# Patient Record
Sex: FEMALE | Race: WHITE | URBAN - METROPOLITAN AREA
[De-identification: names, ages, dates, MRNs, and addresses within clinical notes are randomized per-mention and may not be internally consistent; named-entity substitution may affect disease eponyms.]

---

## 2021-12-10 ENCOUNTER — OFFICE VISIT (OUTPATIENT)
Dept: ORTHOPEDIC SURGERY | Age: 51
End: 2021-12-10
Payer: OTHER GOVERNMENT

## 2021-12-10 VITALS
BODY MASS INDEX: 19.29 KG/M2 | SYSTOLIC BLOOD PRESSURE: 110 MMHG | WEIGHT: 120 LBS | DIASTOLIC BLOOD PRESSURE: 70 MMHG | HEIGHT: 66 IN

## 2021-12-10 DIAGNOSIS — M25.569 KNEE PAIN, UNSPECIFIED CHRONICITY, UNSPECIFIED LATERALITY: Primary | ICD-10-CM

## 2021-12-10 DIAGNOSIS — S83.242S TEAR OF MEDIAL MENISCUS OF LEFT KNEE, UNSPECIFIED TEAR TYPE, UNSPECIFIED WHETHER OLD OR CURRENT TEAR, SEQUELA: ICD-10-CM

## 2021-12-10 PROCEDURE — 99204 OFFICE O/P NEW MOD 45 MIN: CPT | Performed by: ORTHOPAEDIC SURGERY

## 2021-12-10 RX ORDER — LAMOTRIGINE 150 MG/1
TABLET ORAL
COMMUNITY
Start: 2021-11-26

## 2021-12-10 RX ORDER — BUPROPION HYDROCHLORIDE 300 MG/1
TABLET ORAL
COMMUNITY
Start: 2021-11-04

## 2021-12-10 RX ORDER — VENLAFAXINE HYDROCHLORIDE 75 MG/1
75 CAPSULE, EXTENDED RELEASE ORAL DAILY
COMMUNITY
Start: 2021-11-04

## 2021-12-10 RX ORDER — DICLOFENAC SODIUM 10 MG/G
GEL TOPICAL
COMMUNITY
Start: 2021-11-04

## 2021-12-10 NOTE — PATIENT INSTRUCTIONS
MRI SCHEDULING INSTRUCTIONS    In order to schedule your MRI with Artem Frias, please call [phone]. If it is more convenient, you may stop by the MRI department on the way out of the office. The MRI department is located on the 1st floor, [address]. They will happy to assist you. Once you have your MRI scheduled, please contact appointment scheduling at [phone] option #2 to make your follow up appointment with Dr. Mirian Taylor to come in for your results! The appointment should be made for at least 3 business days after the MRI appointment. Thank you,  Dr. Carolina Obregon III, MD         Magnetic Resonance Imaging (MRI): About This Test  What is it? Magnetic resonance imaging (MRI) is a test that uses a magnetic field and pulses of radio wave energy to make pictures of organs and structures inside the body. When you have an MRI, you lie on a table and your body is moved into the MRI machine, where an image is taken of the area of the body being studied. Why is this test done? There are many reasons to have an MRI test. This test can find problems such as tumors, bleeding, injury, conditions that some children are born with, and infection. An MRI also may provide more information about a problem seen on an X-ray, ultrasound scan, CT scan, or nuclear medicine exam.  How can you prepare for the test?  For some MRI pictures of the belly, you may be asked to not eat or drink for several hours before the test.  Tell your doctor if you get nervous in tight spaces. You may get a medicine to help you relax. If you think you'll get this medicine, be sure to arrange a ride home. It may be unsafe for you to drive or get home on your own. How is the test done? · You may have contrast materials (dye) put into your arm through a tube called an IV. · You will lie on a table that is part of the MRI scanner. · The table will slide into the space that contains the magnet.   · Inside the scanner you will hear a fan and feel air moving. You may hear tapping, thumping, or snapping noises. You may be given earplugs or headphones to reduce the noise. · You will be asked to hold still during the scan. You may be asked to hold your breath for short periods. · You may be alone in the scanning room. But a technologist will watch you through a window and talk with you during the test.  What are the risks of the test?  · Contrast material that contains gadolinium may be used in this test. But for most people, the benefit of its use in this test outweighs the risk. Be sure to tell your doctor if you have kidney problems or are pregnant. · If you breastfeed and are concerned about whether the dye used in this test is safe, talk to your doctor. Most experts believe that very little dye passes into breast milk and even less is passed on to the baby. But if you are concerned, you can stop breastfeeding for up to 24 hours after the test. During this time, you can give your baby breast milk that you stored before the test. Don't use the breast milk you pump in the 24 hours after the test. Throw it out. How long does the test take? The test usually takes 30 to 60 minutes. But it can take as long as 2 hours. What happens after the test?  You will probably be able to go home right away, depending on the reason for the test.  Follow-up care is a key part of your treatment and safety. Be sure to make and go to all appointments, and call your doctor if you are having problems. It's also a good idea to keep a list of the medicines you take. Ask your doctor when you can expect to have your test results. Where can you learn more? Go to http://www.gray.com/  Enter V016 in the search box to learn more about \"Magnetic Resonance Imaging (MRI): About This Test.\"  Current as of: June 17, 2021               Content Version: 13.0  © 0892-7339 [employer].    Care instructions adapted under license by [address] (which disclaims liability or warranty for this information). If you have questions about a medical condition or this instruction, always ask your healthcare professional. [address] any warranty or liability for your use of this information.

## 2021-12-10 NOTE — PROGRESS NOTES
ASSESSMENT/PLAN:  Below is the assessment and plan developed based on review of pertinent history, physical exam, labs, studies, and medications. 1. Knee pain, unspecified chronicity, unspecified laterality  -     XR KNEE LT MIN 4 V; Future  2. Tear of medial meniscus of left knee, unspecified tear type, unspecified whether old or current tear, sequela  -     MRI KNEE LT WO CONT; Future      Return for Follow-up after diagnostic test.      In discussion with the patient, we considered the numerus possible diagnoses that could be contributing to their present symptoms. We also deliberated on the extensive management options that must be considered to treat their current condition. We reviewed their accessible prior medical records, diagnostic tests, and current health and employment information. We considered how these symptoms were affecting the patient´s activities of daily living as well as employment and fitness activities. The patient had various questions regarding the possible risks, benefits, complications, morbidity and mortality regarding their diagnosis and treatment options. The patients´ comorbidities were considered, and I advocated that they consider maximizing lifestyle modification through nutrition and exercise to aid in addressing their symptoms. Shared decision making yielded an understanding to move forward with conservation treatment preferences. The patient expressed understanding that if conservative management fails to alleviate the present symptoms they will return to office for re-evaluation and consideration of additional diagnostic tests and potential surgical options.      In the interim, we have recommned ice, elevation and anti-inflammatory medications along with a physician directed home exercise program. We discussed the risks and common side effects of anti-inflamatory medications and instructed the patient to discontinue the medication and contact us if they experienced any side effects. The patient was encouraged to discuss the possible side effects with their family physician or pharmacist prior to initiating any new medications. Given that the patient's symptoms are increasing in frequency and duration, we have decided to evaluate the etiology of the pain and loss of function with an MRI. We discussed the risks of an MRI which include, but are not limited to the enclosed space, noisy environment, magnetic affect on implnated metal. We also talked about the fact that MRI is also contraindicated in the presence of internal metallic objects such as bullets or shrapnel, as well as surgical clips, pins, plates, screws, metal sutures, or wire mesh. We talked about the fact that MRI does not use radiation, but it may be contrindicated if the patient has implanted pacemakers, intracranial aneurysm clips, cochlear implants, certain prosthetic devices, implanted drug infusion pumps, neurostimulators, bone-growth stimulators, certain intrauterine contraceptive devices; or any other type of iron-based metal implants. We discussed the fact that if you are pregnant or suspect that you may be pregnant, you should notify your physician and consult with your primary care or obstetrician before having an MRI. Although rare, we talked about the fact that if contrast dye is used, there is a risk for allergic reaction to the dye. Patients who are allergic to or sensitive to medications, contrast dye, iodine, or shellfish should notify the radiologist or technologist prior to the administration of dye. MRI contrast may also have an effect on other conditions such as allergies, asthma, anemia, hypotension (low blood pressure), and sickle cell disease. The patient has expressed understadning of these risk and I will see the patient back after the MRI to discuss the findings as well as the treatment options.           SUBJECTIVE/OBJECTIVE:  Caroline Henry (: 1970) is a 54 y.o. female, patient,here for evaluation of the Knee Pain (left knee)  She reports she injured her knee 3 weeks ago. She reports she is continued to have discomfort despite anti-inflammatory medications. She reports it bothers her at night. She reports her presents gotten better. She denies any previous injuries to the knee. Physical Exam    Upon physical examination, the patient is well developed, well nourished, alert and oriented times three, with normal mood and affect and walks with an antalgic gait. Upon examination of the left knee, the patient is tender to palpation along the medial joint line, and has an effusion. The patient has discomfort with Almas´s maneuvers, and the knee is stable. They lack full flexion secondary to the effusion, but have full extension. They have 5/5 strength, and are neurovascularly intact distally. There is no erythema, warmth or skin lesions present. On examination of the contralateral extremity, the patient is nontender to palpation and has excellent range of motion, stability and strength. Imagin views of the left knee show no fracture or dislocation. Allergies   Allergen Reactions    Penicillins Hives       Current Outpatient Medications   Medication Sig    buPROPion XL (WELLBUTRIN XL) 300 mg XL tablet TAKE 1 TABLET BY MOUTH IN THE MORNING    diclofenac (VOLTAREN) 1 % gel     lamoTRIgine (LaMICtal) 150 mg tablet TAKE 1 TABLET BY MOUTH IN THE MORNING    venlafaxine-SR (EFFEXOR-XR) 75 mg capsule Take 75 mg by mouth daily. No current facility-administered medications for this visit. History reviewed. No pertinent past medical history. History reviewed. No pertinent surgical history. History reviewed. No pertinent family history.     Social History     Socioeconomic History    Marital status:      Spouse name: Not on file    Number of children: Not on file    Years of education: Not on file    Highest education level: Not on file Occupational History    Not on file   Tobacco Use    Smoking status: Former Smoker    Smokeless tobacco: Never Used   Vaping Use    Vaping Use: Never used   Substance and Sexual Activity    Alcohol use: Yes     Comment: rarely    Drug use: Yes     Types: Marijuana     Comment: rarely    Sexual activity: Not on file   Other Topics Concern    Not on file   Social History Narrative    Not on file     Social Determinants of Health     Financial Resource Strain:     Difficulty of Paying Living Expenses: Not on file   Food Insecurity:     Worried About [facility] in the Last Year: Not on file    Tony of Food in the Last Year: Not on file   Transportation Needs:     Lack of Transportation (Medical): Not on file    Lack of Transportation (Non-Medical): Not on file   Physical Activity:     Days of Exercise per Week: Not on file    Minutes of Exercise per Session: Not on file   Stress:     Feeling of Stress : Not on file   Social Connections:     Frequency of Communication with Friends and Family: Not on file    Frequency of Social Gatherings with Friends and Family: Not on file    Attends Qu[employer] Services: Not on file    Active Member of [address] [place] [address] or Organizations: Not on file    Attends Club or Organization Meetings: Not on file    Marital Status: Not on file   Intimate Partner Violence:     Fear of Current or Ex-Partner: Not on file    Emotionally Abused: Not on file    Physically Abused: Not on file    Sexually Abused: Not on file   Housing Stability:     Unable to Pay for Housing in the Last Year: Not on file    Number of [place] in the Last Year: Not on file    Unstable Housing in the Last Year: Not on file       Review of Systems    No flowsheet data found. Vitals:  /70   Ht 5' 6\" (1.676 m)   Wt 120 lb (54.4 kg)   BMI 19.37 kg/m²    Body mass index is 19.37 kg/m². An electronic signature was used to authenticate this note.   -- Leopoldo Aurora, MD

## 2021-12-21 ENCOUNTER — OFFICE VISIT (OUTPATIENT)
Dept: ORTHOPEDIC SURGERY | Age: 51
End: 2021-12-21
Payer: OTHER GOVERNMENT

## 2021-12-21 DIAGNOSIS — M79.641 RIGHT HAND PAIN: ICD-10-CM

## 2021-12-21 DIAGNOSIS — R22.31 MASS OF FINGER OF RIGHT HAND: Primary | ICD-10-CM

## 2021-12-21 PROCEDURE — 99203 OFFICE O/P NEW LOW 30 MIN: CPT | Performed by: ORTHOPAEDIC SURGERY

## 2021-12-21 NOTE — PROGRESS NOTES
HPI: Nam La (: 1970) is a 54 y.o. female, patient, here for evaluation of the following chief complaint(s):    Hand Pain (left)  The patient seen today to evaluate her right hand. She reported that approxi-20 years ago her right ring finger was caught and went in another direction. She has developed a soft tissue mass versus cyst to the radial border of the right ring finger at the proximal phalanx level. She thinks this time she could have started in the late . She had x-rays obtained through [facility] 2 weeks ago that showed no bone involvement. Her  is active duty. She denies any fall or other injury and is seen for further treatment of her right hand. Vitals: There were no vitals taken for this visit. There is no height or weight on file to calculate BMI. Allergies   Allergen Reactions    Penicillins Hives       Current Outpatient Medications   Medication Sig    buPROPion XL (WELLBUTRIN XL) 300 mg XL tablet TAKE 1 TABLET BY MOUTH IN THE MORNING    diclofenac (VOLTAREN) 1 % gel     lamoTRIgine (LaMICtal) 150 mg tablet TAKE 1 TABLET BY MOUTH IN THE MORNING    venlafaxine-SR (EFFEXOR-XR) 75 mg capsule Take 75 mg by mouth daily. No current facility-administered medications for this visit. History reviewed. No pertinent past medical history. History reviewed. No pertinent surgical history. History reviewed. No pertinent family history.      Social History     Tobacco Use    Smoking status: Former Smoker    Smokeless tobacco: Never Used   Vaping Use    Vaping Use: Never used   Substance Use Topics    Alcohol use: Yes     Comment: rarely    Drug use: Yes     Types: Marijuana     Comment: rarely        Review of Systems    ROS     Positive for: Musculoskeletal    Other comments for: HENT (left hand)    Last edited by Paulo Francisco RN on 2021  4:53 PM. (History)             Physical Exam    Patient is right ring finger is soft tissue swelling of presumed soft tissue mass versus cyst about 1 x 1.5 cm in size to the ulnar border of the proximal phalanx. This appears fairly deep clinically and does not inhibit motion but it is large enough that it seems to keep the small finger abducted. She otherwise is full extension and flexion with normal sensation refill. Examination of the right wrist reveals no swelling, edema or warmth, no tenderness to palpation, no pain, normal strength and tone, normal range of motion, no crepitus, normal sensation, no instability or laxity and no known fractures or deformities. Examination of the left wrist reveals no swelling, edema or warmth, no tenderness to palpation, no pain, normal strength and tone, normal range of motion, no crepitus, normal sensation, no instability or laxity and no known fractures or deformities. Examination of the left hand reveals no tenderness to palpation, no pain, normal  strength, normal tone, normal range of motion, no crepitus, no instability or laxity, no known fractures or deformities and normal sensation. Imaging: Outside x-rays reportedly from [facility] showed normal osseous and joint space findings of the right hand and no fracture or calcification. ASSESSMENT/PLAN:  Below is the assessment and plan developed based on review of pertinent history, physical exam, labs, studies, and medications. Patient examination was consistent with a right ring finger soft tissue mass although a firm cyst could not be excluded. There has been no reported history of a puncture injury. This may represent a fibroma or giant cell tumor tendon sheath but certainly cannot exclude a larger ganglion and/or epidermoid occlusion cyst.  Nonetheless I recommended surgical excision of the lesion. I reviewed risks that include but not limited to stiffness, pain, nerve or tendon damage and recurrence. Arrangements can be made for this to be performed on an outpatient basis at her convenience.     1. Mass of finger of right hand  2. Right hand pain      Return for Follow-up 7 to 10 days after surgery. .    An electronic signature was used to authenticate this note.   -- Steve Yap MD

## 2021-12-22 DIAGNOSIS — R22.31 MASS OF FINGER OF RIGHT HAND: Primary | ICD-10-CM

## 2022-01-03 DIAGNOSIS — R22.31 MASS OF FINGER OF RIGHT HAND: Primary | ICD-10-CM

## 2022-01-03 RX ORDER — HYDROCODONE BITARTRATE AND ACETAMINOPHEN 5; 325 MG/1; MG/1
1 TABLET ORAL
Qty: 15 TABLET | Refills: 0 | Status: SHIPPED | OUTPATIENT
Start: 2022-01-03 | End: 2022-01-06

## 2022-01-04 ENCOUNTER — HOSPITAL ENCOUNTER (OUTPATIENT)
Dept: LAB | Age: 52
Discharge: HOME | End: 2022-01-04

## 2022-01-11 DIAGNOSIS — M25.562 LEFT KNEE PAIN, UNSPECIFIED CHRONICITY: Primary | ICD-10-CM

## 2022-01-14 ENCOUNTER — OFFICE VISIT (OUTPATIENT)
Dept: ORTHOPEDIC SURGERY | Age: 52
End: 2022-01-14
Payer: OTHER GOVERNMENT

## 2022-01-14 VITALS — HEIGHT: 66 IN | BODY MASS INDEX: 19.29 KG/M2 | WEIGHT: 120 LBS

## 2022-01-14 DIAGNOSIS — M79.641 RIGHT HAND PAIN: ICD-10-CM

## 2022-01-14 DIAGNOSIS — R22.31 MASS OF FINGER OF RIGHT HAND: Primary | ICD-10-CM

## 2022-01-14 PROCEDURE — 99024 POSTOP FOLLOW-UP VISIT: CPT | Performed by: PHYSICIAN ASSISTANT

## 2022-01-14 NOTE — PROGRESS NOTES
HPI: Fernando Alvarez (: 1970) is a 54 y.o. female, patient, here for evaluation of the following chief complaint(s):The patient seen today to evaluate her right hand. She reported that approxi-20 years ago her right ring finger was caught and went in another direction. She has developed a soft tissue mass versus cyst to the radial border of the right ring finger at the proximal phalanx level. She thinks this time she could have started in the late . She had x-rays obtained through [facility] 2 weeks ago that showed no bone involvement. Her  is active duty. She denies any fall or other injury and is seen for further treatment of her right hand. She presents today in follow-up on 2022, she underwent a right ring mass excision. 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 Overall, patient is doing well. No issues with pain or function. Finger Pain (Right ring)       Vitals:  Ht 5' 6\" (1.676 m)   Wt 120 lb (54.4 kg)   BMI 19.37 kg/m²    Body mass index is 19.37 kg/m². Allergies   Allergen Reactions    Penicillins Hives       Current Outpatient Medications   Medication Sig    buPROPion XL (WELLBUTRIN XL) 300 mg XL tablet TAKE 1 TABLET BY MOUTH IN THE MORNING    diclofenac (VOLTAREN) 1 % gel     lamoTRIgine (LaMICtal) 150 mg tablet TAKE 1 TABLET BY MOUTH IN THE MORNING    venlafaxine-SR (EFFEXOR-XR) 75 mg capsule Take 75 mg by mouth daily. No current facility-administered medications for this visit. No past medical history on file. No past surgical history on file. No family history on file.      Social History     Tobacco Use    Smoking status: Former Smoker    Smokeless tobacco: Never Used   Vaping Use    Vaping Use: Never used   Substance Use Topics    Alcohol use: Yes     Comment: rarely    Drug use: Yes     Types: Marijuana     Comment: rarely        Review of Systems    Constitutional: No fevers, chills, night sweats, excessive fatigue or weight loss. Musculoskeletal: No joint pain, swelling or redness. No decreased range of motion. Neurologic: No headache, blurred vision, and no areas of focal weakness or numbness. Normal gait. No sensory problems. Respiratory: No dyspnea on exertion, orthopnea, chest pain, cough or hemoptysis. Cardiovascular: No anginal chest pain, irregular heart beat, tachycardia, palpitations or orthopnea  Integumentary: No chronic rashes, inflammation, ulcerations, pruritus, petechiae, purpura, ecchymoses, or skin changes           Physical Exam    General: Alert, cooperative, no distress  Musculosketal: Right hand - Surgical wound is well-healed without any erythema, edema or drainage. No sign of infection. Suture knots were trimmed. Neurologic:  CNII-XII intact, Normal strength, sensation, and reflexes throughout    Imaging:  None    ASSESSMENT/PLAN:  Below is the assessment and plan developed based on review of pertinent history, physical exam, labs, studies, and medications. Right hand. She reported that approxi-20 years ago her right ring finger was caught and went in another direction. She has developed a soft tissue mass versus cyst to the radial border of the right ring finger at the proximal phalanx level. She thinks this time she could have started in the late 1990s. She had x-rays obtained through [facility] 2 weeks ago that showed no bone involvement. Her  is active duty. She denies any fall or other injury and is seen for further treatment of her right hand. She presents today in follow-up on 1/4/2022, she underwent a right ring mass excision. 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 Results reviewed with the patient. Overall, patient is doing well.  She will continue with gentle home care and scar desensitization as needed. She will follow-up in the office in 3 to 4 weeks to review her progress as needed. 1. Mass of finger of right hand  2. Right hand pain      Return in about 4 weeks (around 2/11/2022), or if symptoms worsen or fail to improve. Dr. Yaisr Biswas was available for immediate consult during this encounter. An electronic signature was used to authenticate this note.   -- David Aguirre PA-C

## 2022-01-18 PROBLEM — S83.242A TEAR OF MEDIAL MENISCUS OF LEFT KNEE: Status: ACTIVE | Noted: 2022-01-18

## 2022-01-19 ENCOUNTER — OFFICE VISIT (OUTPATIENT)
Dept: ORTHOPEDIC SURGERY | Age: 52
End: 2022-01-19
Payer: OTHER GOVERNMENT

## 2022-01-19 VITALS — WEIGHT: 120 LBS | HEIGHT: 66 IN | BODY MASS INDEX: 19.29 KG/M2

## 2022-01-19 DIAGNOSIS — S83.242S TEAR OF MEDIAL MENISCUS OF LEFT KNEE, UNSPECIFIED TEAR TYPE, UNSPECIFIED WHETHER OLD OR CURRENT TEAR, SEQUELA: Primary | ICD-10-CM

## 2022-01-19 PROCEDURE — 99214 OFFICE O/P EST MOD 30 MIN: CPT | Performed by: ORTHOPAEDIC SURGERY

## 2022-01-19 NOTE — PROGRESS NOTES
ASSESSMENT/PLAN:  Below is the assessment and plan developed based on review of pertinent history, physical exam, labs, studies, and medications. 1. Tear of medial meniscus of left knee, unspecified tear type, unspecified whether old or current tear, sequela  -     REFERRAL TO PHYSICAL THERAPY      Return in about 4 weeks (around 2/16/2022). In discussion with the patient, we considered the numerus possible diagnoses that could be contributing to their present symptoms. We also deliberated on the extensive management options that must be considered to treat their current condition. We reviewed their accessible prior medical records, diagnostic tests, and current health and employment information. We considered how these symptoms were affecting the patient´s activities of daily living as well as employment and fitness activities. The patient had various questions regarding the possible risks, benefits, complications, morbidity and mortality regarding their diagnosis and treatment options. The patients´ comorbidities were considered, and I advocated that they consider maximizing lifestyle modification through nutrition and exercise to aid in addressing their symptoms. Shared decision making yielded an understanding to move forward with conservation treatment preferences. The patient expressed understanding that if conservative management fails to alleviate the present symptoms they will return to office for re-evaluation and consideration of additional diagnostic tests and potential surgical options. In the interim, we have recommended ice, elevation,  and anti-inflammatory medications along with a physician directed home exercise program. We discussed the risks and common side effects of anti-inflamatory medications and instructed the patient to discontinue the medication and contact us if they experienced any side effects.  The patient was encouraged to discuss the possible side effects with their family physician or pharmacist prior to initiating any new medications. Given that the patient's symptoms are increasing in frequency and duration we have decided to prescribe physical therapy. We talked about the fact that the goal of physical therapy is for the therapist to assist in developing a program to help return the patient to full strength, function and mobility and decrease pain. We also discussed that the therpist may combine several techniques to help decrease pain. These include but are not limited to stretching, balance exercises, strength training, massage, cold and heat therapy, and electrical stimulation. Although, physical therapy is generally safe, we went over the potential risks to include the worsening of pre-existing conditions, continued pain and no improvement in flexibility, mobility, and strength. We will have the patient follow up after physical therapy to closely monitor their progress. We talked about following up sooner if therapy is not progressing on a weekly basis. We talked about operative and nonoperative treatment of meniscus tears. Like to see her back in 3 to 4 weeks to evaluate her progress. I did offer a cortisone shot today which she declined. Imaging:    MRI KNEE LT WO CONT  Narrative: EXAM:  MRI KNEE LT WO CONT    INDICATION: Left knee pain    COMPARISON: 12/10/2021    TECHNIQUE: Axial T2 fat-saturated and proton density fat-saturated; coronal T1  and proton density fat-saturated; and sagittal T2 fat-saturated, proton density  fat-saturated, and gradient echo MRI of the left knee . CONTRAST:  None. FINDINGS:     ACL and PCL: Intact. Collateral ligaments and posterior, lateral corner: Intact. Extensor mechanism: Intact. .     Patellofemoral alignment: No patellar subluxation/tilt. Trochlear groove is not  hypoplastic.  TT-TG distance: 12 mm    Joint fluid: Small     Medial meniscus: Oblique undersurface tear junction body and posterior horn  medial meniscus with degenerative signal extending towards the root insertion. No displaced meniscal fragment      Lateral meniscus: Intact. Articular cartilage: Intact. No focal osteochondral lesion. Bone marrow: Within normal limits. No acute fracture, dislocation, or marrow  replacing process. Soft tissue mass: None. Impression: 1 . Nondisplaced oblique undersurface tear junction body and posterior horn  medial meniscus       ASSESSMENT/PLAN:  Below is the assessment and plan developed based on review of pertinent history, physical exam, labs, studies, and medications. 1. Knee pain, unspecified chronicity, unspecified laterality  -     XR KNEE LT MIN 4 V; Future  2. Tear of medial meniscus of left knee, unspecified tear type, unspecified whether old or current tear, sequela  -     MRI KNEE LT WO CONT; Future      Return for Follow-up after diagnostic test.      In discussion with the patient, we considered the numerus possible diagnoses that could be contributing to their present symptoms. We also deliberated on the extensive management options that must be considered to treat their current condition. We reviewed their accessible prior medical records, diagnostic tests, and current health and employment information. We considered how these symptoms were affecting the patient´s activities of daily living as well as employment and fitness activities. The patient had various questions regarding the possible risks, benefits, complications, morbidity and mortality regarding their diagnosis and treatment options. The patients´ comorbidities were considered, and I advocated that they consider maximizing lifestyle modification through nutrition and exercise to aid in addressing their symptoms. Shared decision making yielded an understanding to move forward with conservation treatment preferences.  The patient expressed understanding that if conservative management fails to alleviate the present symptoms they will return to office for re-evaluation and consideration of additional diagnostic tests and potential surgical options. In the interim, we have recommned ice, elevation and anti-inflammatory medications along with a physician directed home exercise program. We discussed the risks and common side effects of anti-inflamatory medications and instructed the patient to discontinue the medication and contact us if they experienced any side effects. The patient was encouraged to discuss the possible side effects with their family physician or pharmacist prior to initiating any new medications. Given that the patient's symptoms are increasing in frequency and duration, we have decided to evaluate the etiology of the pain and loss of function with an MRI. We discussed the risks of an MRI which include, but are not limited to the enclosed space, noisy environment, magnetic affect on implnated metal. We also talked about the fact that MRI is also contraindicated in the presence of internal metallic objects such as bullets or shrapnel, as well as surgical clips, pins, plates, screws, metal sutures, or wire mesh. We talked about the fact that MRI does not use radiation, but it may be contrindicated if the patient has implanted pacemakers, intracranial aneurysm clips, cochlear implants, certain prosthetic devices, implanted drug infusion pumps, neurostimulators, bone-growth stimulators, certain intrauterine contraceptive devices; or any other type of iron-based metal implants. We discussed the fact that if you are pregnant or suspect that you may be pregnant, you should notify your physician and consult with your primary care or obstetrician before having an MRI. Although rare, we talked about the fact that if contrast dye is used, there is a risk for allergic reaction to the dye.  Patients who are allergic to or sensitive to medications, contrast dye, iodine, or shellfish should notify the radiologist or technologist prior to the administration of dye. MRI contrast may also have an effect on other conditions such as allergies, asthma, anemia, hypotension (low blood pressure), and sickle cell disease. The patient has expressed understadning of these risk and I will see the patient back after the MRI to discuss the findings as well as the treatment options. SUBJECTIVE/OBJECTIVE:  Jenniffer Rene (: 1970) is a 54 y.o. female, patient,here for evaluation of the Knee Pain (left knee)  She reports she injured her knee 3 weeks ago. She reports she is continued to have discomfort despite anti-inflammatory medications. She reports it bothers her at night. She reports her presents gotten better. She denies any previous injuries to the knee. Physical Exam    Upon physical examination, the patient is well developed, well nourished, alert and oriented times three, with normal mood and affect and walks with an antalgic gait. Upon examination of the left knee, the patient is tender to palpation along the medial joint line, and has an effusion. The patient has discomfort with Almas´s maneuvers, and the knee is stable. They lack full flexion secondary to the effusion, but have full extension. They have 5/5 strength, and are neurovascularly intact distally. There is no erythema, warmth or skin lesions present. On examination of the contralateral extremity, the patient is nontender to palpation and has excellent range of motion, stability and strength. Imagin views of the left knee show no fracture or dislocation. Allergies   Allergen Reactions    Penicillins Hives       Current Outpatient Medications   Medication Sig    buPROPion XL (WELLBUTRIN XL) 300 mg XL tablet TAKE 1 TABLET BY MOUTH IN THE MORNING    diclofenac (VOLTAREN) 1 % gel     lamoTRIgine (LaMICtal) 150 mg tablet TAKE 1 TABLET BY MOUTH IN THE MORNING    venlafaxine-SR (EFFEXOR-XR) 75 mg capsule Take 75 mg by mouth daily.      No current facility-administered medications for this visit. History reviewed. No pertinent past medical history. History reviewed. No pertinent surgical history. History reviewed. No pertinent family history. Social History     Socioeconomic History    Marital status:      Spouse name: Not on file    Number of children: Not on file    Years of education: Not on file    Highest education level: Not on file   Occupational History    Not on file   Tobacco Use    Smoking status: Former Smoker    Smokeless tobacco: Never Used   Vaping Use    Vaping Use: Never used   Substance and Sexual Activity    Alcohol use: Yes     Comment: rarely    Drug use: Yes     Types: Marijuana     Comment: rarely    Sexual activity: Not on file   Other Topics Concern    Not on file   Social History Narrative    Not on file     Social Determinants of Health     Financial Resource Strain:     Difficulty of Paying Living Expenses: Not on file   Food Insecurity:     Worried About [address] in the Last Year: Not on file    Tony of Food in the Last Year: Not on file   Transportation Needs:     Lack of Transportation (Medical): Not on file    Lack of Transportation (Non-Medical):  Not on file   Physical Activity:     Days of Exercise per Week: Not on file    Minutes of Exercise per Session: Not on file   Stress:     Feeling of Stress : Not on file   Social Connections:     Frequency of Communication with Friends and Family: Not on file    Frequency of Social Gatherings with Friends and Family: Not on file    Attends Druze Services: Not on file    Active Member of Clubs or Organizations: Not on file    Attends Club or Organization Meetings: Not on file    Marital Status: Not on file   Intimate Partner Violence:     Fear of Current or Ex-Partner: Not on file    Emotionally Abused: Not on file    Physically Abused: Not on file    Sexually Abused: Not on file   Housing Stability:     Unable to Pay for Housing in the Last Year: Not on file    Number of Places Lived in the Last Year: Not on file    Unstable Housing in the Last Year: Not on file       Review of Systems    No flowsheet data found. Vitals:  /70   Ht 5' 6\" (1.676 m)   Wt 120 lb (54.4 kg)   BMI 19.37 kg/m²    Body mass index is 19.37 kg/m². An electronic signature was used to authenticate this note.   -- Don Olivera MD   .

## 2023-02-14 ENCOUNTER — OFFICE VISIT (OUTPATIENT)
Dept: ORTHOPEDIC SURGERY | Age: 53
End: 2023-02-14
Payer: OTHER GOVERNMENT

## 2023-02-14 VITALS — HEIGHT: 66 IN | WEIGHT: 118 LBS | BODY MASS INDEX: 18.96 KG/M2

## 2023-02-14 DIAGNOSIS — M25.532 LEFT WRIST PAIN: ICD-10-CM

## 2023-02-14 DIAGNOSIS — M65.4 DE QUERVAIN'S TENOSYNOVITIS, LEFT: Primary | ICD-10-CM

## 2023-02-14 PROCEDURE — 99213 OFFICE O/P EST LOW 20 MIN: CPT | Performed by: PHYSICIAN ASSISTANT

## 2023-02-14 PROCEDURE — L3809 WHFO W/O JOINTS PRE OTS: HCPCS | Performed by: PHYSICIAN ASSISTANT

## 2023-02-14 RX ORDER — METHYLPREDNISOLONE 4 MG/1
TABLET ORAL
Qty: 1 DOSE PACK | Refills: 0 | Status: SHIPPED | OUTPATIENT
Start: 2023-02-14

## 2023-02-14 NOTE — PROGRESS NOTES
HPI: Montse Hodges (: 1970) is a 50 y.o. female, patient, here for evaluation of the following chief complaint(s):The patient seen today to evaluate her right hand. She reported that approxi-20 years ago her right ring finger was caught and went in another direction. She has developed a soft tissue mass versus cyst to the radial border of the right ring finger at the proximal phalanx level. She thinks this time she could have started in the late . She had x-rays obtained through [facility] 2 weeks ago that showed no bone involvement. Her  is active duty. She denies any fall or other injury and is seen for further treatment of her right hand. She presents today in follow-up on 2022, she underwent a right ring mass excision. 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 Overall, patient is doing well. No issues with pain or function. Patient presents today with complaint of left wrist pain along the radial aspect. This has been bothering her for approximately 3 weeks and is worsening. She was evaluated at [facility] where she had x-rays of the left wrist.  She was given a Velcro strap wrist splint and ultimately referred to us. Patient reports she was told by [facility] they could not rule out a scaphoid fracture, also reports she was not called by the radiologist.  She was evaluated by her primary care physician who believes that she has tendinitis. Patient denies injury/trauma. No numbness or tingling in the hand. X-rays of the left wrist imported into PACS. Wrist Pain (Left)       Vitals:  Ht 5' 6\" (1.676 m)   Wt 118 lb (53.5 kg)   BMI 19.05 kg/m²    Body mass index is 19.05 kg/m².     Allergies   Allergen Reactions    Penicillins Hives       Current Outpatient Medications   Medication Sig    methylPREDNISolone (MEDROL DOSEPACK) 4 mg tablet Per dose pack instructions buPROPion XL (WELLBUTRIN XL) 300 mg XL tablet TAKE 1 TABLET BY MOUTH IN THE MORNING    diclofenac (VOLTAREN) 1 % gel     lamoTRIgine (LaMICtal) 150 mg tablet TAKE 1 TABLET BY MOUTH IN THE MORNING    venlafaxine-SR (EFFEXOR-XR) 75 mg capsule Take 75 mg by mouth daily. No current facility-administered medications for this visit. No past medical history on file. No past surgical history on file. No family history on file. Social History     Tobacco Use    Smoking status: Former    Smokeless tobacco: Never   Vaping Use    Vaping Use: Never used   Substance Use Topics    Alcohol use: Yes     Comment: rarely    Drug use: Yes     Types: Marijuana     Comment: rarely        Review of Systems    Constitutional: No fevers, chills, night sweats, excessive fatigue or weight loss. Musculoskeletal: + Left wrist pain. Neurologic: No headache, blurred vision, and no areas of focal weakness or numbness. Normal gait. No sensory problems. Respiratory: No dyspnea on exertion, orthopnea, chest pain, cough or hemoptysis. Cardiovascular: No anginal chest pain, irregular heart beat, tachycardia, palpitations or orthopnea  Integumentary: No chronic rashes, inflammation, ulcerations, pruritus, petechiae, purpura, ecchymoses, or skin changes       Physical Exam    General: Alert, cooperative, no distress  Musculosketal: Left hand - Full range of motion. Normal sensation. No erythema, edema or warmth to the joints. No cysts. Left wrist - Full range of motion. Normal sensation. No erythema, edema or warmth to the joints. No palpable cysts. Negative Tinel's and Phalen's test.  Positive Finklestein's test.  Neurologic:  CNII-XII intact, Normal strength, sensation, and reflexes throughout    Imaging: X-rays of the left wrist from [facility]  Normal osseous and joint space findings. No clear fracture. No deformities or erosions appreciated.       ASSESSMENT/PLAN:  Below is the assessment and plan developed based on review of pertinent history, physical exam, labs, studies, and medications. Patient presents today with complaint of left wrist pain along the radial aspect. This has been bothering her for approximately 3 weeks and is worsening. She was evaluated at [facility] where she had x-rays of the left wrist.  She was given a Velcro strap wrist splint and ultimately referred to us. Patient reports she was told by [facility] they could not rule out a scaphoid fracture, also reports she was not called by the radiologist.  She was evaluated by her primary care physician who believes that she has tendinitis. Clinical exam is consistent with left wrist de Quervain's tenosynovitis. We will transition her to a left thumb spica Velcro strap wrist splint. She was also given home exercises and a prescription for a steroid burst.  She will follow-up in the office in 3 to 4 weeks to review her progress. 1. De Quervain's tenosynovitis, left  -     REFERRAL TO DME  -     methylPREDNISolone (MEDROL DOSEPACK) 4 mg tablet; Per dose pack instructions, Normal, Disp-1 Dose Pack, R-0  -     [place] [facility] W/O JOINTS PRE OTS  2. Left wrist pain      Return in about 3 weeks (around 3/7/2023). Dr. Sabrina Garcia was available for immediate consult during this encounter. An electronic signature was used to authenticate this note.   -- Donya Billings PA-C